# Patient Record
Sex: FEMALE | Race: WHITE | URBAN - METROPOLITAN AREA
[De-identification: names, ages, dates, MRNs, and addresses within clinical notes are randomized per-mention and may not be internally consistent; named-entity substitution may affect disease eponyms.]

---

## 2021-07-07 ENCOUNTER — OFFICE VISIT (OUTPATIENT)
Dept: URBAN - METROPOLITAN AREA CLINIC 44 | Facility: CLINIC | Age: 61
End: 2021-07-07
Payer: COMMERCIAL

## 2021-07-07 DIAGNOSIS — H25.813 COMBINED FORMS OF AGE-RELATED CATARACT, BILATERAL: Primary | ICD-10-CM

## 2021-07-07 PROCEDURE — 92134 CPTRZ OPH DX IMG PST SGM RTA: CPT | Performed by: STUDENT IN AN ORGANIZED HEALTH CARE EDUCATION/TRAINING PROGRAM

## 2021-07-07 PROCEDURE — 99204 OFFICE O/P NEW MOD 45 MIN: CPT | Performed by: STUDENT IN AN ORGANIZED HEALTH CARE EDUCATION/TRAINING PROGRAM

## 2021-07-07 ASSESSMENT — KERATOMETRY
OS: 43.25
OD: 43.13

## 2021-07-07 ASSESSMENT — INTRAOCULAR PRESSURE
OD: 10
OS: 10

## 2021-07-07 ASSESSMENT — VISUAL ACUITY
OS: 20/70
OD: 20/20

## 2021-07-07 NOTE — IMPRESSION/PLAN
Impression: Combined forms of age-related cataract, bilateral: H25.813. Plan: Patient educated on condition, cataracts are visually significant at this time and patient is interested in surgery OU, OS first possibly monovision. Patient is interested Sx OD due to possible aniso. Discussed options and process of surgery with patient. Patient understands glasses may be needed after sx, referred patient for cataract consultation and evaluation.

## 2021-08-02 ENCOUNTER — ADULT PHYSICAL (OUTPATIENT)
Dept: URBAN - METROPOLITAN AREA CLINIC 44 | Facility: CLINIC | Age: 61
End: 2021-08-02
Payer: COMMERCIAL

## 2021-08-02 DIAGNOSIS — Z01.818 ENCOUNTER FOR OTHER PREPROCEDURAL EXAMINATION: Primary | ICD-10-CM

## 2021-08-02 PROCEDURE — 99203 OFFICE O/P NEW LOW 30 MIN: CPT | Performed by: NURSE PRACTITIONER

## 2021-08-09 ENCOUNTER — OFFICE VISIT (OUTPATIENT)
Dept: URBAN - METROPOLITAN AREA CLINIC 44 | Facility: CLINIC | Age: 61
End: 2021-08-09
Payer: COMMERCIAL

## 2021-08-09 DIAGNOSIS — H53.002 UNSPECIFIED AMBLYOPIA, LEFT EYE: ICD-10-CM

## 2021-08-09 DIAGNOSIS — C80.1 CANCER: ICD-10-CM

## 2021-08-09 DIAGNOSIS — Z98.890 OTHER SPECIFIED POSTPROCEDURAL STATE: ICD-10-CM

## 2021-08-09 DIAGNOSIS — H25.812 COMBINED FORMS OF AGE-RELATED CATARACT, LEFT EYE: ICD-10-CM

## 2021-08-09 DIAGNOSIS — H04.123 DRY EYE SYNDROME OF BILATERAL LACRIMAL GLANDS: ICD-10-CM

## 2021-08-09 PROCEDURE — 99204 OFFICE O/P NEW MOD 45 MIN: CPT | Performed by: OPHTHALMOLOGY

## 2021-08-09 PROCEDURE — 92025 CPTRIZED CORNEAL TOPOGRAPHY: CPT | Performed by: OPHTHALMOLOGY

## 2021-08-09 PROCEDURE — 92136 OPHTHALMIC BIOMETRY: CPT | Performed by: OPHTHALMOLOGY

## 2021-08-09 ASSESSMENT — PACHYMETRY
OD: 23.61
OS: 23.82
OD: 3.35
OS: 3.40

## 2021-08-09 NOTE — IMPRESSION/PLAN
Impression: Cancer: C80.1. Plan: breast; followed by oncol; Discussed with patient, understands this may limit vision after surgery.

## 2021-08-09 NOTE — IMPRESSION/PLAN
Impression: Unspecified amblyopia, left eye: H53.002. Plan: Discussed with patient, understands this may limit vision after surgery.

## 2021-08-09 NOTE — IMPRESSION/PLAN
Impression: Other specified postprocedural state: Z98.890. Plan: s/p AK OS. Discussed with patient, understands this may limit vision after surgery.

## 2021-08-09 NOTE — IMPRESSION/PLAN
Impression: Dry eye syndrome of bilateral lacrimal glands: H04.123. Plan: use art tears; Discussed with patient, understands this may limit vision after surgery.

## 2021-08-09 NOTE — IMPRESSION/PLAN
Impression: Combined forms of age-related cataract, bilateral: H25.813. Plan:  
Discussed cataract diagnosis with the patient. Premium options discussed, declines, and pt understands needs for glasses after surgery Discussed cataract diagnosis with the patient. Risks and benefits of surgical treatment were discussed and understood. Patient desires  surgical treatment to OU, OD first. Both eyes examined , medically necessary due to impact in activities of daily living. Pt prefers to maintain monovision but understands may still need full-time correction after and limitations from amblyopia and emphasizes her right eye is for near , left eye for distance ( recently became near os due to myopic growth)  (( AIM -2.00  OD; -0.25 OS ;  INJECTABLE OU (DEXYCU 1st choice), ORA OU, NO LRI OU: min astig, Hx of AK OS,  monovision , CHANGE TO OD FIRST )) After a long discussion with pt, pt understands the need of glasses after surgery for distance and near and also limitations due to AK ( 50/50 chance of needing additional surgery such as PRK, Lasik or other corneal surgeries) Piggy Back IOL, Removal and replacement, or possibly enhancement with more corneal refractive surgery.

## 2021-08-19 ENCOUNTER — SURGERY (OUTPATIENT)
Dept: URBAN - METROPOLITAN AREA SURGERY 19 | Facility: SURGERY | Age: 61
End: 2021-08-19
Payer: COMMERCIAL

## 2021-08-19 PROCEDURE — 66984 XCAPSL CTRC RMVL W/O ECP: CPT | Performed by: OPHTHALMOLOGY

## 2021-08-20 ENCOUNTER — POST-OPERATIVE VISIT (OUTPATIENT)
Dept: URBAN - METROPOLITAN AREA CLINIC 44 | Facility: CLINIC | Age: 61
End: 2021-08-20

## 2021-08-20 PROCEDURE — 99024 POSTOP FOLLOW-UP VISIT: CPT | Performed by: OPTOMETRIST

## 2021-08-26 ENCOUNTER — ADULT PHYSICAL (OUTPATIENT)
Dept: URBAN - METROPOLITAN AREA CLINIC 44 | Facility: CLINIC | Age: 61
End: 2021-08-26
Payer: COMMERCIAL

## 2021-08-26 DIAGNOSIS — Z48.810 ENCOUNTER FOR SURGICAL AFTERCARE FOLLOWING SURGERY ON A SENSE ORGAN: Primary | ICD-10-CM

## 2021-08-26 PROCEDURE — 99213 OFFICE O/P EST LOW 20 MIN: CPT | Performed by: PHYSICIAN ASSISTANT

## 2021-08-26 PROCEDURE — 99024 POSTOP FOLLOW-UP VISIT: CPT | Performed by: OPTOMETRIST

## 2021-08-26 RX ORDER — METOPROLOL SUCCINATE 25 MG/1
25 MG TABLET, FILM COATED, EXTENDED RELEASE ORAL
Qty: 0 | Refills: 0 | Status: ACTIVE
Start: 2021-08-26

## 2021-08-26 ASSESSMENT — INTRAOCULAR PRESSURE
OD: 14
OD: 14

## 2021-08-26 ASSESSMENT — VISUAL ACUITY
OS: 20/70
OD: 20/20

## 2021-08-26 NOTE — IMPRESSION/PLAN
Impression: S/P Cataract Extraction by phacoemulsification with IOL placement; ORA OD - 7 Days. Encounter for surgical aftercare following surgery on a sense organ  Z48.810. Plan: excellent mono near. ..   OS aim distance

## 2021-09-02 ENCOUNTER — SURGERY (OUTPATIENT)
Dept: URBAN - METROPOLITAN AREA SURGERY 19 | Facility: SURGERY | Age: 61
End: 2021-09-02
Payer: COMMERCIAL

## 2021-09-02 PROCEDURE — 66984 XCAPSL CTRC RMVL W/O ECP: CPT | Performed by: OPHTHALMOLOGY

## 2021-09-03 ENCOUNTER — POST-OPERATIVE VISIT (OUTPATIENT)
Dept: URBAN - METROPOLITAN AREA CLINIC 44 | Facility: CLINIC | Age: 61
End: 2021-09-03

## 2021-09-03 PROCEDURE — 99024 POSTOP FOLLOW-UP VISIT: CPT | Performed by: OPTOMETRIST

## 2021-09-03 ASSESSMENT — INTRAOCULAR PRESSURE: OS: 21
